# Patient Record
Sex: FEMALE | URBAN - METROPOLITAN AREA
[De-identification: names, ages, dates, MRNs, and addresses within clinical notes are randomized per-mention and may not be internally consistent; named-entity substitution may affect disease eponyms.]

---

## 2021-07-19 ENCOUNTER — OFFICE VISIT (OUTPATIENT)
Dept: URBAN - METROPOLITAN AREA CLINIC 56 | Facility: CLINIC | Age: 41
End: 2021-07-19
Payer: COMMERCIAL

## 2021-07-19 DIAGNOSIS — H04.123 DRY EYE SYNDROME OF BILATERAL LACRIMAL GLANDS: ICD-10-CM

## 2021-07-19 DIAGNOSIS — H47.11 PAPILLEDEMA ASSOCIATED WITH INCREASED INTRACRANIAL PRESSURE: Primary | ICD-10-CM

## 2021-07-19 DIAGNOSIS — H57.12 OCULAR PAIN, LEFT EYE: ICD-10-CM

## 2021-07-19 DIAGNOSIS — H47.093 OTH DISORDERS OF OPTIC NERVE, NEC, BILATERAL: ICD-10-CM

## 2021-07-19 PROCEDURE — 92134 CPTRZ OPH DX IMG PST SGM RTA: CPT | Performed by: OPTOMETRIST

## 2021-07-19 PROCEDURE — 92250 FUNDUS PHOTOGRAPHY W/I&R: CPT | Performed by: OPTOMETRIST

## 2021-07-19 PROCEDURE — 92004 COMPRE OPH EXAM NEW PT 1/>: CPT | Performed by: OPTOMETRIST

## 2021-07-19 PROCEDURE — 92133 CPTRZD OPH DX IMG PST SGM ON: CPT | Performed by: OPTOMETRIST

## 2021-07-19 ASSESSMENT — INTRAOCULAR PRESSURE
OS: 16
OD: 16

## 2021-07-19 ASSESSMENT — KERATOMETRY: OD: 43.00

## 2021-07-19 ASSESSMENT — VISUAL ACUITY
OS: 20/40
OD: 20/25

## 2021-07-19 NOTE — IMPRESSION/PLAN
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
-patient is followed by Dr Kacey Deleon: Patient to continue care with Dr Suri Guillen. Per patient she is seen monthly by Dr. Suri Guillen. She has VF testing done there.

## 2021-07-19 NOTE — IMPRESSION/PLAN
Impression: Dry eye syndrome of bilateral lacrimal glands: H04.123. Plan: Explained chronic nature of condition- daily maintenance is needed and there is no cure. Start Artificial Tears QID OU. Start dry warm compresses BID for 5 minutes duration.

## 2021-07-19 NOTE — IMPRESSION/PLAN
Impression: Ocular pain, left eye: H57.12. Plan: Discussed with patient that there is no corneal abnormality. There is no ocular inflammation. See treatment plan for H04.123.

## 2021-07-19 NOTE — IMPRESSION/PLAN
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 Plan: Patient education vision OS is limited by optic nerve. Glasses would only make a small improvement OS. Can give patient glasses Rx, patient declines.